# Patient Record
Sex: MALE | Race: WHITE | Employment: FULL TIME | URBAN - METROPOLITAN AREA
[De-identification: names, ages, dates, MRNs, and addresses within clinical notes are randomized per-mention and may not be internally consistent; named-entity substitution may affect disease eponyms.]

---

## 2019-01-03 ENCOUNTER — HOSPITAL ENCOUNTER (EMERGENCY)
Age: 49
Discharge: HOME | End: 2019-01-03
Attending: EMERGENCY MEDICINE

## 2019-01-03 VITALS
TEMPERATURE: 99.5 F | OXYGEN SATURATION: 97 % | BODY MASS INDEX: 43.52 KG/M2 | HEIGHT: 70 IN | HEART RATE: 100 BPM | WEIGHT: 304 LBS | RESPIRATION RATE: 14 BRPM | SYSTOLIC BLOOD PRESSURE: 139 MMHG | DIASTOLIC BLOOD PRESSURE: 81 MMHG

## 2019-01-03 DIAGNOSIS — H66.90 ACUTE OTITIS MEDIA, UNSPECIFIED OTITIS MEDIA TYPE: ICD-10-CM

## 2019-01-03 DIAGNOSIS — R73.9 HYPERGLYCEMIA: ICD-10-CM

## 2019-01-03 DIAGNOSIS — H81.10 BENIGN PAROXYSMAL POSITIONAL VERTIGO, UNSPECIFIED LATERALITY: Primary | ICD-10-CM

## 2019-01-03 LAB
GLUCOSE BLD-MCNC: 256 MG/DL (ref 70–99)
PERFORMED ON: ABNORMAL

## 2019-01-03 PROCEDURE — 6370000000 HC RX 637 (ALT 250 FOR IP): Performed by: EMERGENCY MEDICINE

## 2019-01-03 PROCEDURE — 99283 EMERGENCY DEPT VISIT LOW MDM: CPT

## 2019-01-03 RX ORDER — MECLIZINE HYDROCHLORIDE 25 MG/1
25 TABLET ORAL 3 TIMES DAILY PRN
Qty: 30 TABLET | Refills: 1 | Status: SHIPPED | OUTPATIENT
Start: 2019-01-03 | End: 2019-01-13

## 2019-01-03 RX ORDER — AMOXICILLIN 500 MG/1
500 TABLET, FILM COATED ORAL 3 TIMES DAILY
Qty: 30 TABLET | Refills: 0 | Status: SHIPPED | OUTPATIENT
Start: 2019-01-03 | End: 2019-01-13

## 2019-01-03 RX ORDER — MECLIZINE HYDROCHLORIDE 25 MG/1
50 TABLET ORAL ONCE
Status: COMPLETED | OUTPATIENT
Start: 2019-01-03 | End: 2019-01-03

## 2019-01-03 RX ADMIN — MECLIZINE HYDROCHLORIDE 50 MG: 25 TABLET ORAL at 15:51

## 2019-01-03 ASSESSMENT — PAIN DESCRIPTION - PAIN TYPE: TYPE: ACUTE PAIN

## 2019-01-03 ASSESSMENT — PAIN DESCRIPTION - DESCRIPTORS: DESCRIPTORS: ACHING

## 2019-01-03 ASSESSMENT — PAIN DESCRIPTION - FREQUENCY: FREQUENCY: CONTINUOUS

## 2019-01-03 ASSESSMENT — PAIN SCALES - GENERAL: PAINLEVEL_OUTOF10: 4

## 2019-01-03 ASSESSMENT — PAIN DESCRIPTION - LOCATION: LOCATION: HEAD

## 2020-12-07 ENCOUNTER — APPOINTMENT (OUTPATIENT)
Dept: CT IMAGING | Age: 50
End: 2020-12-07

## 2020-12-07 ENCOUNTER — APPOINTMENT (OUTPATIENT)
Dept: GENERAL RADIOLOGY | Age: 50
End: 2020-12-07

## 2020-12-07 ENCOUNTER — HOSPITAL ENCOUNTER (EMERGENCY)
Age: 50
Discharge: HOME | End: 2020-12-07
Attending: EMERGENCY MEDICINE

## 2020-12-07 VITALS
HEART RATE: 105 BPM | BODY MASS INDEX: 37.31 KG/M2 | WEIGHT: 260 LBS | DIASTOLIC BLOOD PRESSURE: 94 MMHG | SYSTOLIC BLOOD PRESSURE: 134 MMHG | OXYGEN SATURATION: 99 % | RESPIRATION RATE: 16 BRPM | TEMPERATURE: 97.7 F

## 2020-12-07 PROCEDURE — 70450 CT HEAD/BRAIN W/O DYE: CPT

## 2020-12-07 PROCEDURE — 99282 EMERGENCY DEPT VISIT SF MDM: CPT

## 2020-12-07 PROCEDURE — 73562 X-RAY EXAM OF KNEE 3: CPT

## 2020-12-07 PROCEDURE — 72125 CT NECK SPINE W/O DYE: CPT

## 2020-12-07 PROCEDURE — 73610 X-RAY EXAM OF ANKLE: CPT

## 2020-12-07 PROCEDURE — 73030 X-RAY EXAM OF SHOULDER: CPT

## 2020-12-07 ASSESSMENT — PAIN DESCRIPTION - FREQUENCY: FREQUENCY: CONTINUOUS

## 2020-12-07 ASSESSMENT — PAIN DESCRIPTION - ORIENTATION: ORIENTATION: LEFT

## 2020-12-07 ASSESSMENT — PAIN DESCRIPTION - DESCRIPTORS: DESCRIPTORS: ACHING

## 2020-12-07 ASSESSMENT — PAIN DESCRIPTION - LOCATION: LOCATION: HEAD;ANKLE

## 2020-12-07 ASSESSMENT — PAIN SCALES - GENERAL: PAINLEVEL_OUTOF10: 9

## 2020-12-07 ASSESSMENT — PAIN DESCRIPTION - PAIN TYPE: TYPE: ACUTE PAIN

## 2020-12-07 NOTE — ED NOTES
Pt to ed with c/o tripped on a curb 12 hrs ago and c/o left ankle, knee, neck, shoulder and head, states struck a car bumper, denies LOC, has not taken anything for pain at home.  Exam per MD.      Tiffany Michel RN  12/07/20 0134

## 2020-12-07 NOTE — ED NOTES
Pt dc/d with instructions in stable condition, ambulatory to lobby. Home per ride.      Iam Vargas RN  12/07/20 1569

## 2020-12-07 NOTE — ED PROVIDER NOTES
[facility]  EMERGENCY DEPARTMENTENCOUNTER      Pt Name: Chet Maxwell  MRN: [id number]  [place] 1970  Date ofevaluation: 12/7/2020  Provider: Liliana Sandy MD    CHIEF COMPLAINT       Chief Complaint   Patient presents with    Ankle Pain     struck left side of head, denies LOC 12 hrs ago    Knee Pain       HPI    HISTORY OF PRESENT ILLNESS   (Location/Symptom, Timing/Onset,Context/Setting, Quality, Duration, Modifying Factors, Severity)  Note limiting factors. Chet Maxwell is a 44 y.o. male who presents to the emergency department after falling. This is a 57-year-old male who states he fell earlier today. The patient states he tripped over a curb hitting his left knee rolling his left ankle hitting his left shoulder and head. There was no loss of consciousness. He complains of head neck left shoulder knee and ankle pain. He denies any other complaints. NursingNotes were reviewed. Review of Systems    REVIEW OF SYSTEMS    (2-9 systems for level 4, 10 or more for level 5)     Review of Systems   See HPI  Constitutional: Negative for fever. HENT: Negative for rhinorrhea and sore throat. Eyes: Negative for redness. Respiratory: Negative for shortness of breath. Cardiovascular: Negative for chest pain. Gastrointestinal: Negative for abdominal pain. Genitourinary: Negative for flank pain. Neurological: Negative for headaches. Hematological: Negative for adenopathy. Psychiatric/Behavioral: Negative for confusion. Except as noted above the remainder of the review of systems was reviewed and negative. PAST MEDICAL HISTORY     Past Medical History:   Diagnosis Date    Depression     Diabetes mellitus ([address]     Hyperlipidemia     Hypertension          SURGICALHISTORY     History reviewed. No pertinent surgical history.       CURRENT MEDICATIONS       Previous Medications    ACETAMINOPHEN (APAP EXTRA STRENGTH) 500 MG TABLET Take 2 tablets by mouth 4 times daily    ASPIRIN 81 MG EC TABLET    Take 81 mg by mouth daily    GABAPENTIN (NEURONTIN) 600 MG TABLET    Take 600 mg by mouth 3 times daily. LISINOPRIL PO    Take by mouth    METFORMIN (GLUCOPHAGE) 500 MG TABLET    Take 2 tablets by mouth 2 times daily (with meals)    PIOGLITAZONE HCL (ACTOS PO)    Take by mouth    PRAVASTATIN SODIUM PO    Take by mouth    SERTRALINE (ZOLOFT) 100 MG TABLET    Take 100 mg by mouth daily       ALLERGIES     Augmentin [amoxicillin-pot clavulanate]    FAMILY HISTORY     History reviewed. No pertinent family history.        SOCIAL HISTORY       Social History     Socioeconomic History    Marital status:      Spouse name: None    Number of children: None    Years of education: None    Highest education level: None   Occupational History    None   Social Needs    Financial resource strain: None    Food insecurity     Worry: None     Inability: None    Transportation needs     Medical: None     Non-medical: None   Tobacco Use    Smoking status: Never Smoker    Smokeless tobacco: Never Used   Substance and Sexual Activity    Alcohol use: No    Drug use: No    Sexual activity: None   Lifestyle    Physical activity     Days per week: None     Minutes per session: None    Stress: None   Relationships    Social connections     Talks on phone: None     Gets together: None     Attends [employer] service: None     Active member of club or organization: None     Attends meetings of clubs or organizations: None     Relationship status: None    Intimate partner violence     Fear of current or ex partner: None     Emotionally abused: None     Physically abused: None     Forced sexual activity: None   Other Topics Concern    None   Social History Narrative    None       SCREENINGS             PHYSICAL EXAM    (up to 7 for level 4, 8 or more for level 5)     ED Triage Vitals [12/07/20 0113]   BP Temp Temp Source Pulse Resp SpO2 Height Weight Weight: 260 lb (117.9 kg)           MDM    58-year-old male who presents after a mechanical fall tripping over a curb. He had multiple complaints. X-rays were obtained of the patient's head neck shoulder knee and ankle that were all unremarkable normal and read as benign by the radiologist.  He was discharged with instructions take Tylenol or Motrin for pain. He was given head injury instructions and follow instructions. He is to return if worse. REASSESSMENT              CONSULTS:  None    PROCEDURES:  Unless otherwise noted below, none     Procedures    FINAL IMPRESSION      1. Fall, initial encounter    2. Injury of head, initial encounter          DISPOSITION/PLAN   DISPOSITION Decision To Discharge 12/07/2020 03:07:59 AM      PATIENT REFERREDTO:  Jayden Petty    Call in 1 week  As needed      DISCHARGEMEDICATIONS:  New Prescriptions    No medications on file     Controlled Substances Monitoring:     No flowsheet data found.     (Please note that portions of this note were completed with a voice recognition program.  Efforts were made to edit the dictations but occasionally words are mis-transcribed.)    Fina Valdez MD (electronically signed)  Attending Emergency Physician          Fina Valdez MD  12/07/20 1199

## 2021-08-16 ENCOUNTER — APPOINTMENT (OUTPATIENT)
Dept: GENERAL RADIOLOGY | Age: 51
End: 2021-08-16
Payer: OTHER GOVERNMENT

## 2021-08-16 ENCOUNTER — HOSPITAL ENCOUNTER (EMERGENCY)
Age: 51
Discharge: HOME | End: 2021-08-17
Attending: EMERGENCY MEDICINE
Payer: OTHER GOVERNMENT

## 2021-08-16 VITALS
TEMPERATURE: 99.7 F | SYSTOLIC BLOOD PRESSURE: 151 MMHG | DIASTOLIC BLOOD PRESSURE: 80 MMHG | HEART RATE: 110 BPM | RESPIRATION RATE: 22 BRPM | OXYGEN SATURATION: 98 %

## 2021-08-16 DIAGNOSIS — Z20.822 SUSPECTED COVID-19 VIRUS INFECTION: Primary | ICD-10-CM

## 2021-08-16 PROCEDURE — 99283 EMERGENCY DEPT VISIT LOW MDM: CPT

## 2021-08-16 PROCEDURE — U0003 INFECTIOUS AGENT DETECTION BY NUCLEIC ACID (DNA OR RNA); SEVERE ACUTE RESPIRATORY SYNDROME CORONAVIRUS 2 (SARS-COV-2) (CORONAVIRUS DISEASE [COVID-19]), AMPLIFIED PROBE TECHNIQUE, MAKING USE OF HIGH THROUGHPUT TECHNOLOGIES AS DESCRIBED BY CMS-2020-01-R: HCPCS

## 2021-08-16 PROCEDURE — U0005 INFEC AGEN DETEC AMPLI PROBE: HCPCS

## 2021-08-16 PROCEDURE — 71046 X-RAY EXAM CHEST 2 VIEWS: CPT

## 2021-08-17 LAB — SARS-COV-2: DETECTED

## 2021-08-17 ASSESSMENT — ENCOUNTER SYMPTOMS
GASTROINTESTINAL NEGATIVE: 1
EYES NEGATIVE: 1
COUGH: 1

## 2021-08-17 NOTE — ED PROVIDER NOTES
ED Attending Attestation Note     Date of evaluation: 8/16/2021    This patient was seen by the resident. I have seen and examined the patient, agree with the workup, evaluation, management and diagnosis. The care plan has been discussed. My assessment reveals a 70-year-old male who presents with cough, myalgias, fatigue since Wednesday of last week. Here his lungs are clear. Chest x-ray without infiltrate. No respiratory distress. Very mildly tachycardic. Overall symptomatology is concerning for viral infection highly concerning for COVID-19.      Donelda Meckel, MD  08/17/21 0040

## 2021-08-17 NOTE — ED PROVIDER NOTES
[address] [facility] RESIDENT NOTE       Date of evaluation: 8/16/2021    Chief Complaint     Covid Testing (states he has had a cough for a week, lost smell and taste on Saturday.)      of Present Illness     Darlene Long is a 46 y.o. male who presents to the ED with concerns of COVID-19. One week ago, pt started to develop a cough. He has been coughing so hard that he has vomited as a result. He started to run a fever 3 days ago with Tmax of 102. He lost his sense of taste and smell on Saturday. He has also had significant body aches and fatigue. He has not tried any OTC medications. 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 His significant other at home has also been sick. No abdominal pain, dysuria, hematuria. Endorses hx of HTN, HLD, DM2. Review of Systems     Review of Systems   Constitutional: Negative. HENT: Negative. Eyes: Negative. Respiratory: Positive for cough. Cardiovascular: Negative. Gastrointestinal: Negative. Endocrine: Negative. Genitourinary: Negative. Musculoskeletal: Negative. Neurological: Negative. Hematological: Negative. Psychiatric/Behavioral: Negative. Past Medical, Surgical, Family, and Social History     He has a past medical history of Depression, Diabetes mellitus ([address] Hyperlipidemia, and Hypertension. He has no past surgical history on file. His family history is not on file. He reports that he has never smoked. He has never used smokeless tobacco. He reports that he does not drink alcohol and does not use drugs.     Medications     Discharge Medication List as of 8/17/2021 12:54 AM      CONTINUE these medications which have NOT CHANGED    Details   metFORMIN (GLUCOPHAGE) 500 MG tablet Take 2 tablets by mouth 2 times daily (with meals), Disp-120 tablet, R-1Print      sertraline (ZOLOFT) 100 MG tablet Take 100 mg by mouth dailyHistorical Med Pioglitazone HCl (ACTOS PO) Take by mouthHistorical Med      gabapentin (NEURONTIN) 600 MG tablet Take 600 mg by mouth 3 times daily. Historical Med      PRAVASTATIN SODIUM PO Take by mouthHistorical Med      LISINOPRIL PO Take by mouthHistorical Med      aspirin 81 MG EC tablet Take 81 mg by mouth dailyHistorical Med      acetaminophen (APAP EXTRA STRENGTH) 500 MG tablet Take 2 tablets by mouth 4 times daily, Disp-240 tablet, R-1Print             Allergies     He is allergic to augmentin [amoxicillin-pot clavulanate]. Physical Exam     INITIAL VITALS: BP: (!) 151/80, Temp: 99.7 °F (37.6 °C), Pulse: 110, Resp: 22, SpO2: 98 %   Physical Exam  Constitutional:       General: He is not in acute distress. Appearance: He is not ill-appearing. HENT:      Head: Normocephalic and atraumatic. Eyes:      General:         Right eye: No discharge. Left eye: No discharge. Extraocular Movements: Extraocular movements intact. Cardiovascular:      Rate and Rhythm: Normal rate and regular rhythm. Pulmonary:      Effort: Pulmonary effort is normal. No respiratory distress. Breath sounds: Normal breath sounds. No wheezing or rales. Abdominal:      General: Abdomen is flat. There is no distension. Palpations: Abdomen is soft. Tenderness: There is no abdominal tenderness. There is no guarding or rebound. Musculoskeletal:         General: Normal range of motion. Cervical back: Normal range of motion. Skin:     General: Skin is warm. Neurological:      General: No focal deficit present. Mental Status: He is alert. DiagnosticResults         RADIOLOGY:  XR CHEST (2 VW)   Final Result      No acute pulmonary disease. LABS:   No results found for this visit on 08/16/21.         RECENT VITALS:  BP: (!) 151/80, Temp: 99.7 °F (37.6 °C), Pulse: 110,Resp: 22, SpO2: 98 %     Procedures       ED Course     Nursing Notes, Past Medical Hx, Past Surgical Hx, Social Hx, Allergies, and Family Hx were reviewed. The patient was given the followingmedications:  No orders of the defined types were placed in this encounter. CONSULTS:  None    MEDICAL DECISION MAKING / ASSESSMENT / Malikcecile Casillas is a 46 y.o. male who presents emergency department for cough and possible COVID-19. His examination is as noted above. Lungs are clear bilaterally. No evidence of hypoxia on examination. He is otherwise speaking in full sentences. Patient's clinical symptoms do sound consistent with COVID-19 given his cough, multiple recent exposures who tested positive for COVID-19, and loss of taste and smell. His chest x-ray is without focal opacities or consolidation. He otherwise denies any chest pain. COVID-19 test was ordered for patient. He does note that he was tested previously but the result has not returned. As patient is without hypoxia and is without respiratory distress, patient was deemed appropriate for discharge. Advised patient to call his primary care physician in the morning as he may be a candidate for additional adjunctive therapies. He demonstrated understanding. Patient will also be sent home with pulse ox. Strict return precautions provided. This patient was also evaluated by the attending physician. All care plans werediscussed and agreed upon. Clinical Impression     1.  Suspected COVID-19 virus infection        Disposition     PATIENT REFERRED TO:  [address]    Call in 1 day        DISCHARGE MEDICATIONS:  Discharge Medication List as of 8/17/2021 12:54 AM          DISPOSITION Decision To Discharge 08/17/2021 12:43:54 Bharatianisha Skaggs MD  Resident  08/17/21 [id number]

## 2021-08-18 ENCOUNTER — CARE COORDINATION (OUTPATIENT)
Dept: CARE COORDINATION | Age: 51
End: 2021-08-18

## 2021-08-18 NOTE — CARE COORDINATION
Outreach attempt regarding pt recent visit to the ED. Pt was unable to reach today; ACM left VM message with contact information. ACM will make another outreach attempt at a later date/time.

## 2021-08-19 NOTE — CARE COORDINATION
Second unsuccessful outreach attempt regarding f/u from pt recent visit to the ED. Pt was unable to reach today; ACM left VM message with contact information. No further outreach from [facility] at this time.

## 2022-05-05 ENCOUNTER — APPOINTMENT (OUTPATIENT)
Dept: GENERAL RADIOLOGY | Age: 52
DRG: 313 | End: 2022-05-05
Payer: COMMERCIAL

## 2022-05-05 ENCOUNTER — APPOINTMENT (OUTPATIENT)
Dept: CT IMAGING | Age: 52
DRG: 313 | End: 2022-05-05
Payer: COMMERCIAL

## 2022-05-05 ENCOUNTER — HOSPITAL ENCOUNTER (INPATIENT)
Age: 52
LOS: 1 days | Discharge: LEFT BEFORE DISCHARGE | DRG: 313 | End: 2022-05-05
Attending: EMERGENCY MEDICINE | Admitting: INTERNAL MEDICINE
Payer: COMMERCIAL

## 2022-05-05 VITALS
BODY MASS INDEX: 43.95 KG/M2 | OXYGEN SATURATION: 98 % | TEMPERATURE: 98.1 F | HEIGHT: 70 IN | SYSTOLIC BLOOD PRESSURE: 142 MMHG | WEIGHT: 307 LBS | HEART RATE: 95 BPM | DIASTOLIC BLOOD PRESSURE: 84 MMHG | RESPIRATION RATE: 15 BRPM

## 2022-05-05 DIAGNOSIS — R91.1 PULMONARY NODULE: ICD-10-CM

## 2022-05-05 DIAGNOSIS — R73.9 HYPERGLYCEMIA: ICD-10-CM

## 2022-05-05 DIAGNOSIS — R07.9 CHEST PAIN, UNSPECIFIED TYPE: Primary | ICD-10-CM

## 2022-05-05 LAB
A/G RATIO: 1.5 (ref 1.1–2.2)
ALBUMIN SERPL-MCNC: 4.4 G/DL (ref 3.4–5)
ALP BLD-CCNC: 70 U/L (ref 40–129)
ALT SERPL-CCNC: 40 U/L (ref 10–40)
ANION GAP SERPL CALCULATED.3IONS-SCNC: 13 MMOL/L (ref 3–16)
AST SERPL-CCNC: 29 U/L (ref 15–37)
BASOPHILS ABSOLUTE: 0.1 K/UL (ref 0–0.2)
BASOPHILS RELATIVE PERCENT: 0.9 %
BILIRUB SERPL-MCNC: 0.3 MG/DL (ref 0–1)
BUN BLDV-MCNC: 17 MG/DL (ref 7–20)
CALCIUM SERPL-MCNC: 9.9 MG/DL (ref 8.3–10.6)
CHLORIDE BLD-SCNC: 98 MMOL/L (ref 99–110)
CO2: 24 MMOL/L (ref 21–32)
CREAT SERPL-MCNC: 1.1 MG/DL (ref 0.9–1.3)
EOSINOPHILS ABSOLUTE: 0.1 K/UL (ref 0–0.6)
EOSINOPHILS RELATIVE PERCENT: 1.1 %
GFR AFRICAN AMERICAN: >60
GFR NON-AFRICAN AMERICAN: >60
GLUCOSE BLD-MCNC: 331 MG/DL (ref 70–99)
GLUCOSE BLD-MCNC: 406 MG/DL (ref 70–99)
HCT VFR BLD CALC: 40 % (ref 40.5–52.5)
HEMOGLOBIN: 13.7 G/DL (ref 13.5–17.5)
LYMPHOCYTES ABSOLUTE: 1 K/UL (ref 1–5.1)
LYMPHOCYTES RELATIVE PERCENT: 14 %
MCH RBC QN AUTO: 32.6 PG (ref 26–34)
MCHC RBC AUTO-ENTMCNC: 34.2 G/DL (ref 31–36)
MCV RBC AUTO: 95.2 FL (ref 80–100)
MONOCYTES ABSOLUTE: 0.6 K/UL (ref 0–1.3)
MONOCYTES RELATIVE PERCENT: 8.1 %
NEUTROPHILS ABSOLUTE: 5.6 K/UL (ref 1.7–7.7)
NEUTROPHILS RELATIVE PERCENT: 75.9 %
PDW BLD-RTO: 13.9 % (ref 12.4–15.4)
PERFORMED ON: ABNORMAL
PLATELET # BLD: 203 K/UL (ref 135–450)
PMV BLD AUTO: 9 FL (ref 5–10.5)
POTASSIUM REFLEX MAGNESIUM: 4.2 MMOL/L (ref 3.5–5.1)
RBC # BLD: 4.2 M/UL (ref 4.2–5.9)
SODIUM BLD-SCNC: 135 MMOL/L (ref 136–145)
TOTAL PROTEIN: 7.4 G/DL (ref 6.4–8.2)
TROPONIN: <0.01 NG/ML
WBC # BLD: 7.4 K/UL (ref 4–11)

## 2022-05-05 PROCEDURE — 2580000003 HC RX 258: Performed by: EMERGENCY MEDICINE

## 2022-05-05 PROCEDURE — 80053 COMPREHEN METABOLIC PANEL: CPT

## 2022-05-05 PROCEDURE — 6370000000 HC RX 637 (ALT 250 FOR IP): Performed by: EMERGENCY MEDICINE

## 2022-05-05 PROCEDURE — 1200000000 HC SEMI PRIVATE

## 2022-05-05 PROCEDURE — 93005 ELECTROCARDIOGRAM TRACING: CPT | Performed by: EMERGENCY MEDICINE

## 2022-05-05 PROCEDURE — 99285 EMERGENCY DEPT VISIT HI MDM: CPT

## 2022-05-05 PROCEDURE — 6360000004 HC RX CONTRAST MEDICATION: Performed by: EMERGENCY MEDICINE

## 2022-05-05 PROCEDURE — 96360 HYDRATION IV INFUSION INIT: CPT

## 2022-05-05 PROCEDURE — G0378 HOSPITAL OBSERVATION PER HR: HCPCS

## 2022-05-05 PROCEDURE — 96361 HYDRATE IV INFUSION ADD-ON: CPT

## 2022-05-05 PROCEDURE — 85025 COMPLETE CBC W/AUTO DIFF WBC: CPT

## 2022-05-05 PROCEDURE — 96372 THER/PROPH/DIAG INJ SC/IM: CPT

## 2022-05-05 PROCEDURE — 84484 ASSAY OF TROPONIN QUANT: CPT

## 2022-05-05 PROCEDURE — 71045 X-RAY EXAM CHEST 1 VIEW: CPT

## 2022-05-05 PROCEDURE — 74175 CTA ABDOMEN W/CONTRAST: CPT

## 2022-05-05 RX ORDER — 0.9 % SODIUM CHLORIDE 0.9 %
1000 INTRAVENOUS SOLUTION INTRAVENOUS ONCE
Status: COMPLETED | OUTPATIENT
Start: 2022-05-05 | End: 2022-05-05

## 2022-05-05 RX ORDER — M-VIT,TX,IRON,MINS/CALC/FOLIC 27MG-0.4MG
1 TABLET ORAL DAILY
COMMUNITY

## 2022-05-05 RX ORDER — ASPIRIN 325 MG
325 TABLET ORAL ONCE
Status: COMPLETED | OUTPATIENT
Start: 2022-05-05 | End: 2022-05-05

## 2022-05-05 RX ADMIN — ASPIRIN 325 MG: 325 TABLET ORAL at 15:22

## 2022-05-05 RX ADMIN — SODIUM CHLORIDE 1000 ML: 9 INJECTION, SOLUTION INTRAVENOUS at 14:14

## 2022-05-05 RX ADMIN — IOPAMIDOL 80 ML: 755 INJECTION, SOLUTION INTRAVENOUS at 14:10

## 2022-05-05 RX ADMIN — NITROGLYCERIN 0.5 INCH: 20 OINTMENT TOPICAL at 15:21

## 2022-05-05 RX ADMIN — INSULIN HUMAN 10 UNITS: 100 INJECTION, SOLUTION PARENTERAL at 15:22

## 2022-05-05 ASSESSMENT — ENCOUNTER SYMPTOMS
RHINORRHEA: 0
PHOTOPHOBIA: 0
ABDOMINAL PAIN: 0
DIARRHEA: 0
SHORTNESS OF BREATH: 0
VOMITING: 0
BACK PAIN: 0
WHEEZING: 0
NAUSEA: 0
CHEST TIGHTNESS: 1
COUGH: 0

## 2022-05-05 ASSESSMENT — PAIN SCALES - GENERAL
PAINLEVEL_OUTOF10: 8
PAINLEVEL_OUTOF10: 0
PAINLEVEL_OUTOF10: 5

## 2022-05-05 ASSESSMENT — PAIN - FUNCTIONAL ASSESSMENT
PAIN_FUNCTIONAL_ASSESSMENT: NONE - DENIES PAIN
PAIN_FUNCTIONAL_ASSESSMENT: ACTIVITIES ARE NOT PREVENTED
PAIN_FUNCTIONAL_ASSESSMENT: 0-10

## 2022-05-05 ASSESSMENT — PAIN DESCRIPTION - ORIENTATION
ORIENTATION: LEFT
ORIENTATION: LEFT

## 2022-05-05 ASSESSMENT — PAIN DESCRIPTION - FREQUENCY
FREQUENCY: CONTINUOUS
FREQUENCY: CONTINUOUS

## 2022-05-05 ASSESSMENT — PAIN DESCRIPTION - LOCATION
LOCATION: ARM
LOCATION: ARM;BACK;CHEST

## 2022-05-05 ASSESSMENT — HEART SCORE: ECG: 1

## 2022-05-05 ASSESSMENT — PAIN DESCRIPTION - PAIN TYPE
TYPE: ACUTE PAIN
TYPE: ACUTE PAIN

## 2022-05-05 ASSESSMENT — PAIN DESCRIPTION - ONSET: ONSET: ON-GOING

## 2022-05-05 ASSESSMENT — PAIN DESCRIPTION - DESCRIPTORS
DESCRIPTORS: DISCOMFORT
DESCRIPTORS: DISCOMFORT

## 2022-05-05 NOTE — PROGRESS NOTES
This patient went AMA with this nurse. He stated he felt much better and all the test are good. This nurse message the MD but no response at this time and the patient is rushing to go home. He signed the [facility] paper and this nurse removed his IV and he went home quickly.

## 2022-05-05 NOTE — PROGRESS NOTES
Patient arrived from [facility] ED. Patient vitals stable. Room orientation completed, telemetry put on patient, call light and room phone put on bedside table, patient instructed on facility mask and visitor policies. Patient resting in bed.

## 2022-05-05 NOTE — ED PROVIDER NOTES
Emergency Department Provider Note  Location: [address] [place] [address]  5/5/2022     Patient Identification  Megan Barnett is a 45 y.o. male    Chief Complaint  Arm Pain (left arm pain and left sided neck pain for the past hour)          HPI  (History provided by patient)  Patient is a 57-year-old male history of non-insulin-dependent diabetes obesity hypertension hyperlipidemia strong family history of CAD who presents with left-sided chest pain rating to the left shoulder and down the left arm started 1 hour prior to arrival.  Patient states that he has a paresthesia sensation in his distal left hand. Describes an achy back pain in the left scapula as well. No other numbness or weakness. He was sitting not doing anything exertional and started. States he feels clammy but denies any nausea diaphoresis lightheadedness or other autonomic symptoms. No shortness of breath. No leg pain leg swelling no history of DVT or PE. No exacerbating or alleviating factors. Does not believe it is exertional and it is not pleuritic. I have reviewed the following nursing documentation:  Allergies: Allergies   Allergen Reactions    Augmentin [Amoxicillin-Pot Clavulanate] Nausea And Vomiting       Past medical history:  has a past medical history of Depression, Diabetes mellitus ([address] Hyperlipidemia, and Hypertension. Past surgical history:  has no past surgical history on file. Home medications:   Prior to Admission medications    Medication Sig Start Date End Date Taking?  Authorizing Provider   Multiple Vitamins-Minerals (THERAPEUTIC MULTIVITAMIN-MINERALS) tablet Take 1 tablet by mouth daily   Yes Historical Provider, MD   metFORMIN (GLUCOPHAGE) 500 MG tablet Take 2 tablets by mouth 2 times daily (with meals) 1/3/19 2/2/19  Gabbi Mccoy MD   sertraline (ZOLOFT) 100 MG tablet Take 100 mg by mouth daily    Historical Provider, MD   Pioglitazone HCl (ACTOS PO) Take by mouth    Historical Provider, MD   gabapentin (NEURONTIN) 600 MG tablet Take 600 mg by mouth 3 times daily. Historical Provider, MD   PRAVASTATIN SODIUM PO Take by mouth    Historical Provider, MD   LISINOPRIL PO Take by mouth    Historical Provider, MD   aspirin 81 MG EC tablet Take 81 mg by mouth daily    Historical Provider, MD   acetaminophen (APAP EXTRA STRENGTH) 500 MG tablet Take 2 tablets by mouth 4 times daily 1/11/18 2/10/18  Randall Romero MD       Social history:  reports that he has never smoked. He has never used smokeless tobacco. He reports that he does not drink alcohol and does not use drugs. Family history:  History reviewed. No pertinent family history. ROS  Review of Systems   Constitutional: Negative for chills and fever. HENT: Negative for congestion and rhinorrhea. Eyes: Negative for photophobia and visual disturbance. Respiratory: Positive for chest tightness. Negative for cough, shortness of breath and wheezing. Cardiovascular: Positive for chest pain. Negative for palpitations. Gastrointestinal: Negative for abdominal pain, diarrhea, nausea and vomiting. Genitourinary: Negative for dysuria and hematuria. Musculoskeletal: Negative for back pain and neck pain. Skin: Negative for rash and wound. Neurological: Positive for numbness. Negative for syncope and weakness. Psychiatric/Behavioral: Negative for agitation and confusion. Exam  ED Triage Vitals [05/05/22 1315]   BP Temp Temp Source Pulse Resp SpO2 Height Weight   134/80 98.3 °F (36.8 °C) Oral 113 16 98 % 5' 10\" (1.778 m) (!) 307 lb (139.3 kg)       Physical Exam  Vitals and nursing note reviewed. Constitutional:       General: He is not in acute distress. Appearance: He is well-developed. He is obese. HENT:      Head: Normocephalic and atraumatic. Nose: Nose normal. No congestion. Eyes:      Extraocular Movements: Extraocular movements intact.       Pupils: Pupils are equal, round, and reactive to light. Cardiovascular:      Rate and Rhythm: Regular rhythm. Tachycardia present. Heart sounds: No murmur heard. Comments: Equal radial pulses  Pulmonary:      Effort: Pulmonary effort is normal.      Breath sounds: Normal breath sounds. Comments: No chest wall tenderness no crepitus  Abdominal:      General: There is no distension. Palpations: Abdomen is soft. Tenderness: There is no abdominal tenderness. Musculoskeletal:         General: No deformity. Normal range of motion. Cervical back: Normal range of motion and neck supple. Skin:     General: Skin is warm. Findings: No rash. Neurological:      Mental Status: He is alert and oriented to person, place, and time. Motor: No abnormal muscle tone. Coordination: Coordination normal.      Comments: NIH stroke scale 0, no cranial nerve deficits appreciated, strength 5 out of 5 all extremities, sensation is intact, no central ataxia, no cerebellar signs present.      Psychiatric:         Mood and Affect: Mood normal.         Behavior: Behavior normal.           ED Course    ED Medication Orders (From admission, onward)    Start Ordered     Status Ordering Provider    05/05/22 1515 05/05/22 1505  aspirin tablet 325 mg  ONCE         Last MAR action: Given - by Reinier Cha on 05/05/22 at [facility]. JONA Goel    05/05/22 1515 05/05/22 1505  nitroglycerin (NITRO-BID) 2 % ointment 0.5 inch  ONCE         Last MAR action: Given - by Reinier Cha on 05/05/22 at [facility]. [address], [id number] [address]    05/05/22 1515 05/05/22 1507  insulin regular (HUMULIN R;NOVOLIN R) injection 10 Units  ONCE         Last MAR action: Given - by Reinier Cha on 05/05/22 at 1522 JONA PAYTON    05/05/22 1400 05/05/22 1357  0.9 % sodium chloride bolus  ONCE         Last MAR action: New Bag - by Cara Galindo on 05/05/22 at 1414 JONA PAYTON    05/05/22 1358 05/05/22 1358  iopamidol (ISOVUE-370) 76 % injection 80 mL  IMG ONCE PRN Last MAR action: Given - by Graham Taylor on 05/05/22 at 1410 JONA PAYTON          EKG  Sinus tachycardic rhythm rate 105 normal axis normal intervals no evidence of conduction abnormality no diagnostic ischemic changes noted, nonspecific ST flattening in the inferolateral leads       Radiology  XR CHEST PORTABLE    Result Date: 5/5/2022  Clinical History:  Left arm and left chest pain. Comparisons: 8/16/2021 Findings: Single projection timed at 1348  hours. Transverse diameter of heart within normal limits. Clear lungs. No acute cardiopulmonary disease. CTA CHEST ABDOMEN W CONTRAST    Result Date: 5/5/2022  CTA CHEST ABDOMEN W CONTRAST Indication: CP back pain, LUE numb Technique: First helical CT images of the chest were performed without intravenous contrast media. Then CTA images of the chest, abdomen and pelvis were obtained after administration of intravenous contrast media. Axial, coronal, and sagittal reformatted  images were constructed from the raw data set. Up-to-date CT equipment and radiation dose reduction techniques were employed. 80 mL Isovue-370. 3D images including MIP constructed from the raw data set and reviewed. Comparison: None available Findings: Angiographic findings: Thoracic aorta is normal in caliber. No dissection or aortic injury. Standard three-vessel branching pattern of the arch. Abdominal aorta is normal in caliber. No dissection flap or evidence of abdominal aortic injury. Celiac artery and its branches are patent. Superior mesenteric artery and its branches are patent. The main right or main left renal artery are patent. Accessory right renal artery is. Inferior mesenteric artery is patent. Bilateral iliac arteries are patent without flow-limiting stenosis. Nonangiographic findings: Chest: Chest wall and lower neck: No significant abnormality. Airways: Airways are patent. Lungs: Lungs are clear. Nodules: -5 mm pulmonary nodule in the left upper lobe. Pleura: No pleural effusions or significant pleural thickening. Heart and great vessels: Heart size is normal.  No pericardial effusion. Thoracic aorta is normal in caliber. Other mediastinal structures: Normal. Adenopathy: None. Abdomen and pelvis: Liver: Normal. Biliary Tract / Gallbladder: No intra or extrahepatic biliary dilation. No gallstones. No gallbladder wall thickening or pericholecystic fluid. Pancreas: Normal. Spleen: Normal. Adrenals: Normal. Kidneys and ureters: Normal. No hydronephrosis. Lymph nodes: None enlarged. Stomach and bowel: No bowel obstruction or abnormal bowel wall thickening. Appendix is normal. Mesentery/peritoneum: No ascites or free intraperitoneal air. No abdominal fluid collection. Vessels/retroperitoneum: Normal. Body wall: No abnormality. Urinary bladder: Normal. Reproductive organs: Unremarkable. Musculoskeletal: No destructive osseous abnormality. Impression: No evidence of thoracic aortic injury or other acute cardiopulmonary abnormality. 5 mm pulmonary nodule in the left upper lobe. Following the Fleischner Society 2017 guidelines for single solid nodules <6 mm, if patient is low risk no routine follow-up is suggested unless suspicious morphology or upper lobe location. If patient is high risk, then optional CT at 12 months is suggested. qzxv No evidence of abdominal aortic injury or other acute abnormality in the abdomen or pelvis. Labs  Results for orders placed or performed during the hospital encounter of 05/05/22   CBC with Auto Differential   Result Value Ref Range    WBC 7.4 4.0 - 11.0 K/uL    RBC 4.20 4. 20 - 5.90 M/uL    Hemoglobin 13.7 13.5 - 17.5 g/dL    Hematocrit 40.0 (L) 40.5 - 52.5 %    MCV 95.2 80.0 - 100.0 fL    MCH 32.6 26.0 - 34.0 pg    MCHC 34.2 31.0 - 36.0 g/dL    RDW 13.9 12.4 - 15.4 %    Platelets 801 445 - 121 K/uL    MPV 9.0 5.0 - 10.5 fL    Neutrophils % 75.9 %    Lymphocytes % 14.0 %    Monocytes % 8.1 %    Eosinophils % 1.1 % Basophils % 0.9 %    Neutrophils Absolute 5.6 1.7 - 7.7 K/uL    Lymphocytes Absolute 1.0 1.0 - 5.1 K/uL    Monocytes Absolute 0.6 0.0 - 1.3 K/uL    Eosinophils Absolute 0.1 0.0 - 0.6 K/uL    Basophils Absolute 0.1 0.0 - 0.2 K/uL   Comprehensive Metabolic Panel w/ Reflex to MG   Result Value Ref Range    Sodium 135 (L) 136 - 145 mmol/L    Potassium reflex Magnesium 4.2 3.5 - 5.1 mmol/L    Chloride 98 (L) 99 - 110 mmol/L    CO2 24 21 - 32 mmol/L    Anion Gap 13 3 - 16    Glucose 406 (H) 70 - 99 mg/dL    BUN 17 7 - 20 mg/dL    CREATININE 1.1 0.9 - 1.3 mg/dL    GFR Non-African American >60 >60    GFR African American >60 >60    Calcium 9.9 8.3 - 10.6 mg/dL    Total Protein 7.4 6.4 - 8.2 g/dL    Albumin 4.4 3.4 - 5.0 g/dL    Albumin/Globulin Ratio 1.5 1.1 - 2.2    Total Bilirubin 0.3 0.0 - 1.0 mg/dL    Alkaline Phosphatase 70 40 - 129 U/L    ALT 40 10 - 40 U/L    AST 29 15 - 37 U/L   Troponin   Result Value Ref Range    Troponin <0.01 <0.01 ng/mL   EKG 12 Lead   Result Value Ref Range    Ventricular Rate 107 BPM    Atrial Rate 107 BPM    P-R Interval 134 ms    QRS Duration 92 ms    Q-T Interval 332 ms    QTc Calculation (Bazett) 443 ms    P Axis 44 degrees    R Axis 5 degrees    T Axis 24 degrees    Diagnosis       Sinus tachycardiaInferior infarct , age undeterminedAbnormal ECG         MDM  Patient seen and evaluated. Relevant records reviewed. 68-year-old male presents with chest pain rating to the left arm. Patient is slightly anxious but no acute distress otherwise. He is mildly tachycardic otherwise vitals within normal limits. He is not diaphoretic has fairly normal exam other than notable for obesity. He has no focal neurological deficits and equal radial pulses. Chest x-ray does not show mediastinal widening however due to the back pain in the neurological complaint sent him for CTA to evaluate for dissection or other vascular injury. It is negative for this.   Incidental pulmonary nodule discussed with patient. EKG does not show any clear ischemic pattern however story is concerning for ACS and he is moderate to high risk by calculated heart score. Troponin negative x1. Would benefit from admission further work-up. He is given aspirin and nitroglycerin patch. Will discuss with hospitalist at [facility]    Clinical Impression:  1. Chest pain, unspecified type    2. Pulmonary nodule          Disposition:  Admit to telemetry in stable condition. Blood pressure 132/74, pulse 103, temperature 98.3 °F (36.8 °C), temperature source Oral, resp. rate 20, height 5' 10\" (1.778 m), weight (!) 307 lb (139.3 kg), SpO2 98 %. Patient was given scripts for the following medications. I counseled patient how to take these medications. New Prescriptions    No medications on file       Disposition referral (if applicable):  No follow-up provider specified. Total critical care time is 20 minutes, which excludes separately billable procedures and updating family. Time spent is specifically for management of the presenting complaint and symptoms initially, direct bedside care, reevaluation, review of records, and consultation. There was a high probability of clinically significant life-threatening deterioration in the patient's condition, which required my urgent intervention. This chart was generated in part by using Dragon Dictation system and may contain errors related to that system including errors in grammar, punctuation, and spelling, as well as words and phrases that may be inappropriate. If there are any questions or concerns please feel free to contact the dictating provider for clarification.      Quoc Méndez MD  [address] MD  05/05/22 0019

## 2022-05-05 NOTE — ED NOTES
Pt denies lightheadedness/dizziness. Pt states he feels okay to walk. Pt ambulated to restroom, steady gait. Oriented to call light in restroom for assistance.      Eric Brooks RN  05/05/22 2385

## 2022-05-06 LAB
EKG ATRIAL RATE: 107 BPM
EKG DIAGNOSIS: NORMAL
EKG P AXIS: 44 DEGREES
EKG P-R INTERVAL: 134 MS
EKG Q-T INTERVAL: 332 MS
EKG QRS DURATION: 92 MS
EKG QTC CALCULATION (BAZETT): 443 MS
EKG R AXIS: 5 DEGREES
EKG T AXIS: 24 DEGREES
EKG VENTRICULAR RATE: 107 BPM

## 2022-05-06 PROCEDURE — 93010 ELECTROCARDIOGRAM REPORT: CPT | Performed by: INTERNAL MEDICINE

## 2022-05-16 NOTE — DISCHARGE SUMMARY
Hospital Medicine Discharge Summary    Patient ID: Darlene Long      Patient's PCP: Laxmi Lazcano    Admit Date: 5/5/2022     Discharge Date: 5/16/2022    Admitting Physician: Aroldo Gabriel MD     Discharge Physician: Aroldo Gabriel MD     Discharge Diagnoses: Active Hospital Problems    Diagnosis Date Noted    Chest pain [R07.9] 05/05/2022     Priority: Medium     See plan in progress note from this date for full hospital problem list.    The patient was seen and examined on day of discharge and this discharge summary is in conjunction with any daily progress note from day of discharge. Hospital Course:    HPI per admitting physician:    Please see admission H&P as well as progress note from this date. Physical Exam Performed:     BP (!) 142/84   Pulse 95   Temp 98.1 °F (36.7 °C) (Oral)   Resp 15   Ht 5' 10\" (1.778 m)   Wt (!) 307 lb (139.3 kg)   SpO2 98%   BMI 44.05 kg/m²     Please see progress note from this date    Labs: For convenience and continuity at follow-up the following most recent labs are provided:      CBC:    Lab Results   Component Value Date    WBC 7.4 05/05/2022    HGB 13.7 05/05/2022    HCT 40.0 05/05/2022     05/05/2022       Renal:    Lab Results   Component Value Date     05/05/2022    K 4.2 05/05/2022    CL 98 05/05/2022    CO2 24 05/05/2022    BUN 17 05/05/2022    CREATININE 1.1 05/05/2022    CALCIUM 9.9 05/05/2022         Significant Diagnostic Studies    Radiology:   CTA CHEST ABDOMEN W CONTRAST   Final Result   Impression:      No evidence of thoracic aortic injury or other acute cardiopulmonary abnormality. 5 mm pulmonary nodule in the left upper lobe. Following the Fleischner Society 2017 guidelines for single solid nodules <6 mm, if patient is low risk no routine follow-up is suggested unless suspicious morphology or upper lobe location. If patient is high risk, then optional CT at 12 months is    suggested.   qzxv      No evidence of abdominal aortic injury or other acute abnormality in the abdomen or pelvis. XR CHEST PORTABLE   Final Result      No acute cardiopulmonary disease. Consults:     None    Disposition:  AMA    Condition at Discharge: Stable    Discharge Instructions/Follow-up:  Follow up with PCP in 1 week for hospital follow-up and to review any pending labs/tests. Please follow other discharge instructions as outlined in the discharge instructions    Code Status:  No Order    Activity: activity as tolerated    Diet: No diet orders on file    Discharge Medications:     Discharge Medication List as of 5/5/2022  8:02 PM           Details   Multiple Vitamins-Minerals (THERAPEUTIC MULTIVITAMIN-MINERALS) tablet Take 1 tablet by mouth dailyHistorical Med      metFORMIN (GLUCOPHAGE) 500 MG tablet Take 2 tablets by mouth 2 times daily (with meals), Disp-120 tablet, R-1Print      sertraline (ZOLOFT) 100 MG tablet Take 100 mg by mouth dailyHistorical Med      Pioglitazone HCl (ACTOS PO) Take by mouthHistorical Med      gabapentin (NEURONTIN) 600 MG tablet Take 600 mg by mouth 3 times daily. Historical Med      PRAVASTATIN SODIUM PO Take by mouthHistorical Med      LISINOPRIL PO Take by mouthHistorical Med      aspirin 81 MG EC tablet Take 81 mg by mouth dailyHistorical Med      acetaminophen (APAP EXTRA STRENGTH) 500 MG tablet Take 2 tablets by mouth 4 times daily, Disp-240 tablet, R-1Print             Time Spent on discharge is 35 minutes in the examination, evaluation, counseling and review of medications and discharge plan. Signed:    Anselmo Portre MD   5/16/2022      Thank you JEROD Camacho for the opportunity to be involved in this patient's care. If you have any questions or concerns please feel free to contact me at [phone].

## 2022-05-16 NOTE — H&P
Hospital Medicine History & Physical      PCP: JEROD ALY [address]    Date of Admission: 5/16/2022    Date of Service: 5/16/2022    Pt seen/examined on 5/16/2022    Admitted to Inpatient with expected LOS greater than two midnights due to medical therapy. Chief Complaint:     Chief Complaint   Patient presents with    Arm Pain     left arm pain and left sided neck pain for the past hour       History Of Present Illness:      45 y.o. male transferred from [facility], left AMA before was able to see this patient. Past Medical History:      Reviewed and non-contributory except as noted below      Diagnosis Date    Depression     Diabetes mellitus ([address]     Hyperlipidemia     Hypertension        Past Surgical History:      Reviewed and non-contributory except as noted below  History reviewed. No pertinent surgical history. Medications Prior to Admission:      Reviewed and non-contributory except as noted below  Prior to Admission medications    Medication Sig Start Date End Date Taking? Authorizing Provider   Multiple Vitamins-Minerals (THERAPEUTIC MULTIVITAMIN-MINERALS) tablet Take 1 tablet by mouth daily   Yes Historical Provider, MD   metFORMIN (GLUCOPHAGE) 500 MG tablet Take 2 tablets by mouth 2 times daily (with meals) 1/3/19 2/2/19  Brice Davis MD   sertraline (ZOLOFT) 100 MG tablet Take 100 mg by mouth daily    Historical Provider, MD   Pioglitazone HCl (ACTOS PO) Take by mouth    Historical Provider, MD   gabapentin (NEURONTIN) 600 MG tablet Take 600 mg by mouth 3 times daily.     Historical Provider, MD   PRAVASTATIN SODIUM PO Take by mouth    Historical Provider, MD   LISINOPRIL PO Take by mouth    Historical Provider, MD   aspirin 81 MG EC tablet Take 81 mg by mouth daily    Historical Provider, MD   acetaminophen (APAP EXTRA STRENGTH) 500 MG tablet Take 2 tablets by mouth 4 times daily 1/11/18 2/10/18  Sid Kim MD       Allergies:     Reviewed and non-contributory except as noted below   Augmentin [amoxicillin-pot clavulanate]    Social History:      Reviewed and non-contributory except as noted below    TOBACCO:   reports that he has never smoked. He has never used smokeless tobacco.  ETOH:   reports no history of alcohol use. Family History:      Reviewed and non-contributory except as noted below    History reviewed. No pertinent family history. REVIEW OF SYSTEMS:   Pertinent positives and negatives as noted in the HPI. All other systems reviewed and negative. PHYSICAL EXAM PERFORMED:    BP (!) 142/84   Pulse 95   Temp 98.1 °F (36.7 °C) (Oral)   Resp 15   Ht 5' 10\" (1.778 m)   Wt (!) 307 lb (139.3 kg)   SpO2 98%   BMI 44.05 kg/m²     Labs:     No results for input(s): WBC, HGB, HCT, PLT in the last 72 hours. No results for input(s): NA, K, CL, CO2, BUN, CREATININE, CALCIUM, PHOS in the last 72 hours. Invalid input(s): MAGNES  No results for input(s): AST, ALT, BILIDIR, BILITOT, ALKPHOS in the last 72 hours. No results for input(s): INR in the last 72 hours. No results for input(s): [place] in the last 72 hours. Urinalysis:    No results found for: [place]anord Gaurang, BACTERIA, RBCUA, BLOODU, [address], [address] [place] [address]    Radiology:     CTA CHEST ABDOMEN W CONTRAST   Final Result   Impression:      No evidence of thoracic aortic injury or other acute cardiopulmonary abnormality. 5 mm pulmonary nodule in the left upper lobe. Following the Fleischner Society 2017 guidelines for single solid nodules <6 mm, if patient is low risk no routine follow-up is suggested unless suspicious morphology or upper lobe location. If patient is high risk, then optional CT at 12 months is    suggested. qzxv      No evidence of abdominal aortic injury or other acute abnormality in the abdomen or pelvis. XR CHEST PORTABLE   Final Result      No acute cardiopulmonary disease.              ASSESSMENT:    Active Hospital Problems    Diagnosis Date Noted    Chest pain [R07.9] 05/05/2022     Priority: Medium       PLAN:    #Chest pain  Left AMA before I was able to see or examine this patient. DVT Prophylaxis: Lovenox  Diet: No diet orders on file  Code Status: No Order    PT/OT Eval Status: Deferred    Dispo: Porfirio Lopez MD    Thank you JEROD Castellano for the opportunity to be involved in this patient's care. If you have any questions or concerns please feel free to contact me at [phone].